# Patient Record
Sex: FEMALE | Race: OTHER
[De-identification: names, ages, dates, MRNs, and addresses within clinical notes are randomized per-mention and may not be internally consistent; named-entity substitution may affect disease eponyms.]

---

## 2017-02-03 NOTE — TELEPHONE ENCOUNTER
Fax received from [facility] requesting refills. The following are not on pt's current med list. Please advise if appropriate.     Propanolol 20mg  humalog 100U/mL  Lancets 23ga

## 2017-02-05 RX ORDER — METFORMIN HYDROCHLORIDE 500 MG/1
1000 TABLET, EXTENDED RELEASE ORAL
Qty: 60 TAB | Refills: 3 | OUTPATIENT
Start: 2017-02-05

## 2017-02-05 RX ORDER — LANCETS
EACH MISCELLANEOUS
Qty: 120 EACH | Refills: 11 | OUTPATIENT
Start: 2017-02-05

## 2017-02-10 DIAGNOSIS — E11.8 UNCONTROLLED TYPE 2 DIABETES MELLITUS WITH COMPLICATION, UNSPECIFIED LONG TERM INSULIN USE STATUS: ICD-10-CM

## 2017-02-10 DIAGNOSIS — E11.65 UNCONTROLLED TYPE 2 DIABETES MELLITUS WITH COMPLICATION, UNSPECIFIED LONG TERM INSULIN USE STATUS: ICD-10-CM

## 2017-11-21 ENCOUNTER — HOSPITAL ENCOUNTER (EMERGENCY)
Dept: HOSPITAL 54 - ER | Age: 42
Discharge: HOME | End: 2017-11-21
Payer: MEDICAID

## 2017-11-21 VITALS — WEIGHT: 110 LBS | HEIGHT: 59 IN | BODY MASS INDEX: 22.18 KG/M2

## 2017-11-21 VITALS — DIASTOLIC BLOOD PRESSURE: 91 MMHG | SYSTOLIC BLOOD PRESSURE: 124 MMHG

## 2017-11-21 DIAGNOSIS — E03.9: ICD-10-CM

## 2017-11-21 DIAGNOSIS — F17.200: ICD-10-CM

## 2017-11-21 DIAGNOSIS — F10.10: ICD-10-CM

## 2017-11-21 DIAGNOSIS — R51: Primary | ICD-10-CM

## 2017-11-21 DIAGNOSIS — Z79.4: ICD-10-CM

## 2017-11-21 DIAGNOSIS — E11.9: ICD-10-CM

## 2017-11-21 PROCEDURE — Z7610: HCPCS

## 2017-11-21 PROCEDURE — 99282 EMERGENCY DEPT VISIT SF MDM: CPT

## 2017-11-21 PROCEDURE — A4606 OXYGEN PROBE USED W OXIMETER: HCPCS

## 2021-11-03 ENCOUNTER — HOSPITAL ENCOUNTER (EMERGENCY)
Dept: HOSPITAL 54 - ER | Age: 46
Discharge: HOME | End: 2021-11-03
Payer: COMMERCIAL

## 2021-11-03 VITALS — DIASTOLIC BLOOD PRESSURE: 81 MMHG | SYSTOLIC BLOOD PRESSURE: 128 MMHG

## 2021-11-03 VITALS — BODY MASS INDEX: 18.55 KG/M2 | WEIGHT: 92 LBS | HEIGHT: 59 IN

## 2021-11-03 DIAGNOSIS — Z20.822: ICD-10-CM

## 2021-11-03 DIAGNOSIS — F17.200: ICD-10-CM

## 2021-11-03 DIAGNOSIS — E03.9: ICD-10-CM

## 2021-11-03 DIAGNOSIS — H60.91: ICD-10-CM

## 2021-11-03 DIAGNOSIS — J06.9: Primary | ICD-10-CM

## 2021-11-03 DIAGNOSIS — H61.21: ICD-10-CM

## 2021-11-03 PROCEDURE — C9803 HOPD COVID-19 SPEC COLLECT: HCPCS

## 2021-11-03 PROCEDURE — 99283 EMERGENCY DEPT VISIT LOW MDM: CPT

## 2021-11-03 PROCEDURE — U0003 INFECTIOUS AGENT DETECTION BY NUCLEIC ACID (DNA OR RNA); SEVERE ACUTE RESPIRATORY SYNDROME CORONAVIRUS 2 (SARS-COV-2) (CORONAVIRUS DISEASE [COVID-19]), AMPLIFIED PROBE TECHNIQUE, MAKING USE OF HIGH THROUGHPUT TECHNOLOGIES AS DESCRIBED BY CMS-2020-01-R: HCPCS

## 2021-11-03 PROCEDURE — 69209 REMOVE IMPACTED EAR WAX UNI: CPT

## 2021-11-03 PROCEDURE — 99406 BEHAV CHNG SMOKING 3-10 MIN: CPT

## 2021-11-03 NOTE — NUR
BIBS FOR RIGHT EAR PAIN SINCE YESTERDAY. RATES 6/10. DENIES CHANGE IN HEARTING. 
WILL CONTINUE TO MONITOR THE PATIENT.